# Patient Record
Sex: FEMALE | Race: WHITE | NOT HISPANIC OR LATINO | Employment: PART TIME | URBAN - METROPOLITAN AREA
[De-identification: names, ages, dates, MRNs, and addresses within clinical notes are randomized per-mention and may not be internally consistent; named-entity substitution may affect disease eponyms.]

---

## 2018-10-05 ENCOUNTER — OFFICE VISIT (OUTPATIENT)
Dept: URGENT CARE | Facility: CLINIC | Age: 58
End: 2018-10-05
Payer: COMMERCIAL

## 2018-10-05 VITALS
RESPIRATION RATE: 16 BRPM | HEART RATE: 75 BPM | WEIGHT: 160 LBS | SYSTOLIC BLOOD PRESSURE: 161 MMHG | BODY MASS INDEX: 27.31 KG/M2 | DIASTOLIC BLOOD PRESSURE: 90 MMHG | HEIGHT: 64 IN | OXYGEN SATURATION: 99 % | TEMPERATURE: 97 F

## 2018-10-05 DIAGNOSIS — L03.114 CELLULITIS OF LEFT UPPER EXTREMITY: ICD-10-CM

## 2018-10-05 DIAGNOSIS — L23.7 POISON IVY: Primary | ICD-10-CM

## 2018-10-05 PROCEDURE — 99203 OFFICE O/P NEW LOW 30 MIN: CPT | Performed by: PHYSICIAN ASSISTANT

## 2018-10-05 PROCEDURE — 96372 THER/PROPH/DIAG INJ SC/IM: CPT | Performed by: PHYSICIAN ASSISTANT

## 2018-10-05 RX ORDER — METHYLPREDNISOLONE SODIUM SUCCINATE 40 MG/ML
40 INJECTION, POWDER, LYOPHILIZED, FOR SOLUTION INTRAMUSCULAR; INTRAVENOUS ONCE
Status: COMPLETED | OUTPATIENT
Start: 2018-10-05 | End: 2018-10-05

## 2018-10-05 RX ORDER — CLINDAMYCIN HYDROCHLORIDE 300 MG/1
300 CAPSULE ORAL 3 TIMES DAILY
Qty: 21 CAPSULE | Refills: 0 | Status: SHIPPED | OUTPATIENT
Start: 2018-10-05 | End: 2018-10-12

## 2018-10-05 RX ORDER — PREDNISONE 10 MG/1
TABLET ORAL
Qty: 40 TABLET | Refills: 0 | Status: SHIPPED | OUTPATIENT
Start: 2018-10-05

## 2018-10-05 RX ADMIN — METHYLPREDNISOLONE SODIUM SUCCINATE 40 MG: 40 INJECTION, POWDER, LYOPHILIZED, FOR SOLUTION INTRAMUSCULAR; INTRAVENOUS at 15:56

## 2018-10-05 NOTE — PATIENT INSTRUCTIONS
Take medications as directed  Continue to monitor symptoms  If symptoms worsen go immediately to [facility]  Follow up with family doctor this week  Poison Ivy   WHAT YOU NEED TO KNOW:   Poison ivy is a plant that can cause an itchy, uncomfortable rash on your skin  Poison ivy grows as a shrub or vine in woods, fields, and areas of thick [place]  It has 3 bright green leaves on each stem that turn red in me[place]  DISCHARGE INSTRUCTIONS:   Medicines:   · Antiseptic or drying creams or ointments: These medicines may be used to dry out the rash and decrease the itching  These products may be available without a doctor's order  · Steroids: This medicine helps decrease itching and inflammation  It can be given as a cream to apply to your skin or as a pill  · Antihistamines: This medicine may help decrease itching and help you sleep  It is available without a doctor's order  · Take your medicine as directed  Contact your healthcare provider if you think your medicine is not helping or if you have side effects  Tell him of her if you are allergic to any medicine  Keep a list of the medicines, vitamins, and herbs you take  Include the amounts, and when and why you take them  Bring the list or the pill bottles to follow-up visits  Carry your medicine list with you in case of an emergency  Follow up with your healthcare provider as directed:  Write down your questions so you remember to ask them during your visits  How your poison ivy rash spreads: You cannot spread poison ivy by touching your rash or the liquid from your blisters  Poison ivy is spread only if you scratch your skin while it still has oil on it  You may think your rash is spreading because new rashes appear over a number of days  This happens because areas covered by thin skin break out in a rash first  Your face or forearms may develop a rash before thicker areas, such as the palms of your hands     Self-care:   · Keep your rash clean and dry:  Wash it with soap and water  Gently pat it dry with a clean towel  · Try not to scratch or rub your rash: This can cause your skin to become infected  · Use a compress on your rash:  Dip a clean washcloth in cool water  Wring it out and place it on your rash  Leave the washcloth on your skin for 15 minutes  Do this at least 3 times per day  · Take a cornstarch or oatmeal bath: If your rash is too large to cover with wet washcloths, take 3 or 4 cornstarch baths daily  Mix 1 pound of cornstarch with a little water to make a paste  Add the paste to a tub full of water and mix well  You may also use colloidal oatmeal in the bath water  Use lukewarm water  Avoid hot water because it may cause your itching to increase  Prevent a poison ivy rash in the future:   · Wear skin protection:  Wear long pants, a long-sleeved shirt, and gloves  Use a skin block lotion to protect your skin from poison ivy oil  You can find this at a drugstore without a prescription  · Wash clothing after possible exposure: If you think you have been near a poison ivy plant, wash the clothes you were wearing separately from other clothes  Rinse the washing machine well after you take the clothes out  Scrub boots and shoes with warm, soapy water  Dry clean items and clothing that you cannot wash in water  Poison ivy oil is sticky and can stay on surfaces for a long time  It can cause a new rash even years later  · Bathe your pet:  Use warm water and shampoo on your pet's fur  This will prevent the spread of oil to your skin, car, and home  Wear long sleeves, long pants, and gloves while washing pets or any items that may have oil on them  · Reduce exposure to poison ivy:  Do not touch plants that look like poison ivy  Keep your yard free of poison ivy  While protecting your skin, remove the plant and the roots  Place them in a plastic bag and seal the bag tightly  · Do not burn poison ivy plants:   This can spread the oil through the air  If you breathe the oil into your lungs, you could have swelling and serious breathing problems  Oil that clings to the fire [place] can land on your skin and cause a rash  Contact your healthcare provider if:   · You have pus, soft yellow scabs, or tenderness on the rash  · The itching gets worse or keeps you awake at night  · The rash covers more than 1/4 of your skin or spreads to your eyes, mouth, or genital area  · The rash is not better after 2 to 3 weeks  · You have tender, swollen glands on the sides of your neck  · You have swelling in your arms and legs  · You have questions or concerns about your condition or care  Return to the emergency department if:   · You have a fever  · You have redness, swelling, and tenderness around the rash  · You have trouble breathing  © 2017 [employer]  Information is for End User's use only and may not be sold, redistributed or otherwise used for commercial purposes  All illustrations and images included in CareNotes® are the copyrighted property of [employer] , [employer]  or [employer]  The above information is an  only  It is not intended as medical advice for individual conditions or treatments  Talk to your doctor, nurse or pharmacist before following any medical regimen to see if it is safe and effective for you

## 2018-10-05 NOTE — PROGRESS NOTES
[id number] [email or web address]        NAME: Audrey Fuentes is a 62 y o  female  : 1960    MRN: [id number]  DATE: 2018  TIME: 3:56 PM    Assessment and Plan   Poison ivy [L23 7]  1  Poison ivy  methylPREDNISolone sodium succinate (Solu-MEDROL) injection 40 mg    predniSONE 10 mg tablet   2  Cellulitis of left upper extremity  clindamycin (CLEOCIN) 300 MG capsule         Patient Instructions     Take medications as directed  Continue to monitor symptoms  If symptoms worsen go immediately to [facility]  Follow up with family doctor this week  Chief Complaint     Chief Complaint   Patient presents with   [facility]     bilateral arms and legs x 1 week         History of Present Illness       Poison Evaline [place]   This is a new problem  Episode onset: One week  The problem has been rapidly worsening since onset  Location: Bilateral forearms and bilateral upper thighs  Rash characteristics: Red, raised, itchy, blistering  Patient states in the past few days the redness and warmth on her left forearm have gotten significantly worse with swelling noted  Associated with: All this started after patient was working in garden pulling weeds  Pertinent negatives include no congestion, cough, diarrhea, facial edema, fatigue, fever, rhinorrhea, shortness of breath or vomiting  Treatments tried: Calamine lotion, Benadryl  Review of Systems   Review of Systems   Constitutional: Negative for chills, diaphoresis, fatigue and fever  HENT: Negative for congestion, ear pain, rhinorrhea, sinus pressure and voice change  Eyes: Negative  Respiratory: Negative for cough, chest tightness and shortness of breath  Cardiovascular: Negative for chest pain and palpitations  Gastrointestinal: Negative for abdominal pain, constipation, diarrhea, nausea and vomiting  Endocrine: Negative  Genitourinary: Negative for dysuria  Musculoskeletal: Negative  Allergic/Immunologic: Negative      Neurological: Negative for dizziness, syncope and headaches  Hematological: Negative  Psychiatric/Behavioral: Negative  Current Medications       Current Outpatient Prescriptions:     clindamycin (CLEOCIN) 300 MG capsule, Take 1 capsule (300 mg total) by mouth 3 (three) times a day for 7 days, Disp: 21 capsule, Rfl: 0    predniSONE 10 mg tablet, Take 4 pills PO once in the morning for first 4 days  3 Pills PO for next 4 days, 2 pills PO for next 4 days, 1 pill PO for next 4 days  , Disp: 40 tablet, Rfl: 0    Current Facility-Administered Medications:     methylPREDNISolone sodium succinate (Solu-MEDROL) injection 40 mg, 40 mg, Intravenous, Once, Ale Byers PA-C    Current Allergies     Allergies as of 10/05/2018 - Reviewed 10/05/2018   Allergen Reaction Noted    Penicillins  10/05/2018            The following portions of the patient's history were reviewed and updated as appropriate: allergies, current medications, past family history, past medical history, past social history, past surgical history and problem list      History reviewed  No pertinent past medical history  History reviewed  No pertinent surgical history  History reviewed  No pertinent family history  Medications have been verified  Objective   /90   Pulse 75   Temp (!) 97 °F (36 1 °C)   Resp 16   Ht 5' 4" (1 626 m)   Wt 72 6 kg (160 lb)   SpO2 99%   BMI 27 46 kg/m²        Physical Exam     Physical Exam   Constitutional: She is oriented to person, place, and time  She appears well-developed and well-nourished  No distress  HENT:   Head: Normocephalic and atraumatic  Eyes: Conjunctivae are normal  Right eye exhibits no discharge  Left eye exhibits no discharge  No scleral icterus  Neck: Normal range of motion  Neck supple  Cardiovascular: Normal rate, regular rhythm, normal heart sounds and intact distal pulses  Pulmonary/Chest: Effort normal and breath sounds normal  No respiratory distress  She has no wheezes   She has no rales  Musculoskeletal: She exhibits no edema or deformity  Lymphadenopathy:     She has no cervical adenopathy  Neurological: She is alert and oriented to person, place, and time  Skin: Skin is warm  Rash (Red, raised, itchy, pustular rash noted to bilateral forearms, bilateral upper legs, in linear distribution  Left forearm is red, warm, edematous, with excoriations noted ) noted  She is not diaphoretic  Nursing note and vitals reviewed